# Patient Record
Sex: MALE | Race: WHITE | NOT HISPANIC OR LATINO | URBAN - METROPOLITAN AREA
[De-identification: names, ages, dates, MRNs, and addresses within clinical notes are randomized per-mention and may not be internally consistent; named-entity substitution may affect disease eponyms.]

---

## 2017-03-02 ENCOUNTER — OUTPATIENT (OUTPATIENT)
Dept: OUTPATIENT SERVICES | Facility: HOSPITAL | Age: 62
LOS: 1 days | Discharge: HOME | End: 2017-03-02

## 2017-06-27 DIAGNOSIS — E78.2 MIXED HYPERLIPIDEMIA: ICD-10-CM

## 2017-06-27 DIAGNOSIS — D47.2 MONOCLONAL GAMMOPATHY: ICD-10-CM

## 2017-06-27 DIAGNOSIS — I10 ESSENTIAL (PRIMARY) HYPERTENSION: ICD-10-CM

## 2017-07-29 ENCOUNTER — OUTPATIENT (OUTPATIENT)
Dept: OUTPATIENT SERVICES | Facility: HOSPITAL | Age: 62
LOS: 1 days | Discharge: HOME | End: 2017-07-29

## 2017-07-29 DIAGNOSIS — N39.0 URINARY TRACT INFECTION, SITE NOT SPECIFIED: ICD-10-CM

## 2017-07-29 DIAGNOSIS — Z79.899 OTHER LONG TERM (CURRENT) DRUG THERAPY: ICD-10-CM

## 2017-07-29 DIAGNOSIS — D47.2 MONOCLONAL GAMMOPATHY: ICD-10-CM

## 2017-07-29 DIAGNOSIS — R53.81 OTHER MALAISE: ICD-10-CM

## 2017-07-29 DIAGNOSIS — R53.83 OTHER FATIGUE: ICD-10-CM

## 2017-07-29 DIAGNOSIS — Z12.12 ENCOUNTER FOR SCREENING FOR MALIGNANT NEOPLASM OF RECTUM: ICD-10-CM

## 2017-08-04 ENCOUNTER — OUTPATIENT (OUTPATIENT)
Dept: OUTPATIENT SERVICES | Facility: HOSPITAL | Age: 62
LOS: 1 days | Discharge: HOME | End: 2017-08-04

## 2017-08-04 DIAGNOSIS — R82.90 UNSPECIFIED ABNORMAL FINDINGS IN URINE: ICD-10-CM

## 2017-08-05 ENCOUNTER — OUTPATIENT (OUTPATIENT)
Dept: OUTPATIENT SERVICES | Facility: HOSPITAL | Age: 62
LOS: 1 days | Discharge: HOME | End: 2017-08-05

## 2017-08-05 DIAGNOSIS — R82.90 UNSPECIFIED ABNORMAL FINDINGS IN URINE: ICD-10-CM

## 2017-08-07 ENCOUNTER — OUTPATIENT (OUTPATIENT)
Dept: OUTPATIENT SERVICES | Facility: HOSPITAL | Age: 62
LOS: 1 days | Discharge: HOME | End: 2017-08-07

## 2017-08-07 DIAGNOSIS — R82.90 UNSPECIFIED ABNORMAL FINDINGS IN URINE: ICD-10-CM

## 2018-01-18 ENCOUNTER — OUTPATIENT (OUTPATIENT)
Dept: OUTPATIENT SERVICES | Facility: HOSPITAL | Age: 63
LOS: 1 days | Discharge: HOME | End: 2018-01-18

## 2018-01-18 DIAGNOSIS — R70.0 ELEVATED ERYTHROCYTE SEDIMENTATION RATE: ICD-10-CM

## 2018-01-18 DIAGNOSIS — R68.89 OTHER GENERAL SYMPTOMS AND SIGNS: ICD-10-CM

## 2018-01-18 DIAGNOSIS — R74.0 NONSPECIFIC ELEVATION OF LEVELS OF TRANSAMINASE AND LACTIC ACID DEHYDROGENASE [LDH]: ICD-10-CM

## 2018-01-18 DIAGNOSIS — C34.90 MALIGNANT NEOPLASM OF UNSPECIFIED PART OF UNSPECIFIED BRONCHUS OR LUNG: ICD-10-CM

## 2018-01-18 DIAGNOSIS — N39.0 URINARY TRACT INFECTION, SITE NOT SPECIFIED: ICD-10-CM

## 2018-05-26 ENCOUNTER — OUTPATIENT (OUTPATIENT)
Dept: OUTPATIENT SERVICES | Facility: HOSPITAL | Age: 63
LOS: 1 days | Discharge: HOME | End: 2018-05-26

## 2018-05-26 DIAGNOSIS — C34.90 MALIGNANT NEOPLASM OF UNSPECIFIED PART OF UNSPECIFIED BRONCHUS OR LUNG: ICD-10-CM

## 2018-05-26 DIAGNOSIS — R68.89 OTHER GENERAL SYMPTOMS AND SIGNS: ICD-10-CM

## 2018-09-20 ENCOUNTER — OUTPATIENT (OUTPATIENT)
Dept: OUTPATIENT SERVICES | Facility: HOSPITAL | Age: 63
LOS: 1 days | Discharge: HOME | End: 2018-09-20

## 2018-09-20 DIAGNOSIS — E78.00 PURE HYPERCHOLESTEROLEMIA, UNSPECIFIED: ICD-10-CM

## 2018-09-20 DIAGNOSIS — I10 ESSENTIAL (PRIMARY) HYPERTENSION: ICD-10-CM

## 2018-09-20 DIAGNOSIS — E03.9 HYPOTHYROIDISM, UNSPECIFIED: ICD-10-CM

## 2018-09-20 DIAGNOSIS — D47.1 CHRONIC MYELOPROLIFERATIVE DISEASE: ICD-10-CM

## 2019-01-23 ENCOUNTER — OUTPATIENT (OUTPATIENT)
Dept: OUTPATIENT SERVICES | Facility: HOSPITAL | Age: 64
LOS: 1 days | Discharge: HOME | End: 2019-01-23

## 2019-01-23 DIAGNOSIS — D51.1 VITAMIN B12 DEFICIENCY ANEMIA DUE TO SELECTIVE VITAMIN B12 MALABSORPTION WITH PROTEINURIA: ICD-10-CM

## 2019-01-23 DIAGNOSIS — C34.90 MALIGNANT NEOPLASM OF UNSPECIFIED PART OF UNSPECIFIED BRONCHUS OR LUNG: ICD-10-CM

## 2019-01-23 DIAGNOSIS — D50.9 IRON DEFICIENCY ANEMIA, UNSPECIFIED: ICD-10-CM

## 2019-01-23 DIAGNOSIS — M06.9 RHEUMATOID ARTHRITIS, UNSPECIFIED: ICD-10-CM

## 2019-01-23 DIAGNOSIS — D59.9 ACQUIRED HEMOLYTIC ANEMIA, UNSPECIFIED: ICD-10-CM

## 2019-05-24 ENCOUNTER — OUTPATIENT (OUTPATIENT)
Dept: OUTPATIENT SERVICES | Facility: HOSPITAL | Age: 64
LOS: 1 days | Discharge: HOME | End: 2019-05-24

## 2019-05-24 DIAGNOSIS — D59.9 ACQUIRED HEMOLYTIC ANEMIA, UNSPECIFIED: ICD-10-CM

## 2019-05-24 DIAGNOSIS — D51.1 VITAMIN B12 DEFICIENCY ANEMIA DUE TO SELECTIVE VITAMIN B12 MALABSORPTION WITH PROTEINURIA: ICD-10-CM

## 2019-05-24 DIAGNOSIS — D47.2 MONOCLONAL GAMMOPATHY: ICD-10-CM

## 2019-05-24 DIAGNOSIS — C34.90 MALIGNANT NEOPLASM OF UNSPECIFIED PART OF UNSPECIFIED BRONCHUS OR LUNG: ICD-10-CM

## 2019-05-24 DIAGNOSIS — D50.9 IRON DEFICIENCY ANEMIA, UNSPECIFIED: ICD-10-CM

## 2019-05-24 DIAGNOSIS — M06.9 RHEUMATOID ARTHRITIS, UNSPECIFIED: ICD-10-CM

## 2019-09-10 ENCOUNTER — OUTPATIENT (OUTPATIENT)
Dept: OUTPATIENT SERVICES | Facility: HOSPITAL | Age: 64
LOS: 1 days | Discharge: HOME | End: 2019-09-10

## 2019-09-10 DIAGNOSIS — Z12.5 ENCOUNTER FOR SCREENING FOR MALIGNANT NEOPLASM OF PROSTATE: ICD-10-CM

## 2019-09-10 DIAGNOSIS — E78.00 PURE HYPERCHOLESTEROLEMIA, UNSPECIFIED: ICD-10-CM

## 2019-09-10 DIAGNOSIS — N39.0 URINARY TRACT INFECTION, SITE NOT SPECIFIED: ICD-10-CM

## 2019-09-10 DIAGNOSIS — N40.1 BENIGN PROSTATIC HYPERPLASIA WITH LOWER URINARY TRACT SYMPTOMS: ICD-10-CM

## 2019-09-10 DIAGNOSIS — D50.9 IRON DEFICIENCY ANEMIA, UNSPECIFIED: ICD-10-CM

## 2019-09-10 DIAGNOSIS — R97.20 ELEVATED PROSTATE SPECIFIC ANTIGEN [PSA]: ICD-10-CM

## 2020-03-20 ENCOUNTER — EMERGENCY (EMERGENCY)
Facility: HOSPITAL | Age: 65
LOS: 0 days | Discharge: HOME | End: 2020-03-20
Attending: EMERGENCY MEDICINE | Admitting: EMERGENCY MEDICINE
Payer: MEDICARE

## 2020-03-20 VITALS
SYSTOLIC BLOOD PRESSURE: 152 MMHG | HEIGHT: 67 IN | OXYGEN SATURATION: 95 % | TEMPERATURE: 98 F | DIASTOLIC BLOOD PRESSURE: 95 MMHG | HEART RATE: 87 BPM | WEIGHT: 190.04 LBS | RESPIRATION RATE: 18 BRPM

## 2020-03-20 DIAGNOSIS — S61.210A LACERATION WITHOUT FOREIGN BODY OF RIGHT INDEX FINGER WITHOUT DAMAGE TO NAIL, INITIAL ENCOUNTER: ICD-10-CM
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
## 2020-03-20 DIAGNOSIS — Z23 ENCOUNTER FOR IMMUNIZATION: ICD-10-CM

## 2020-03-20 DIAGNOSIS — E78.00 PURE HYPERCHOLESTEROLEMIA, UNSPECIFIED: ICD-10-CM
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
## 2020-03-20 DIAGNOSIS — E78.5 HYPERLIPIDEMIA, UNSPECIFIED: ICD-10-CM
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
## 2020-03-20 PROCEDURE — 99283 EMERGENCY DEPT VISIT LOW MDM: CPT | Mod: 25

## 2020-03-20 PROCEDURE — 12002 RPR S/N/AX/GEN/TRNK2.6-7.5CM: CPT

## 2020-03-20 RX ORDER — TETANUS TOXOID, REDUCED DIPHTHERIA TOXOID AND ACELLULAR PERTUSSIS VACCINE, ADSORBED 5; 2.5; 8; 8; 2.5 [IU]/.5ML; [IU]/.5ML; UG/.5ML; UG/.5ML; UG/.5ML
0.5 SUSPENSION INTRAMUSCULAR ONCE
Refills: 0 | Status: COMPLETED | OUTPATIENT
Start: 2020-03-20 | End: 2020-03-20

## 2020-03-20 RX ADMIN — TETANUS TOXOID, REDUCED DIPHTHERIA TOXOID AND ACELLULAR PERTUSSIS VACCINE, ADSORBED 0.5 MILLILITER(S): 5; 2.5; 8; 8; 2.5 SUSPENSION INTRAMUSCULAR at 17:24

## 2020-03-20 NOTE — ED PROVIDER NOTE - NS ED ROS FT
CONST: No fever, chills or body aches  MSk: + finger pain.   SKIN: + laceration.   NEURO: no paresthesias. No headache, dizziness,

## 2020-03-20 NOTE — ED PROVIDER NOTE - ATTENDING CONTRIBUTION TO CARE
Bedside and Verbal shift change report given to Johnnie Martin RN. 63yo M presenting with laceration to R finger sp accidental injury with while slicing. No numbness/focal weakness. No other injuries. NVI. no nailbed involvement. +venous bleeding.

## 2020-03-20 NOTE — ED PROCEDURE NOTE - CPROC ED POST PROC CARE GUIDE1
Instructed patient/caregiver to follow-up with primary care physician./Verbal/written post procedure instructions were given to patient/caregiver./Keep the cast/splint/dressing clean and dry./Instructed patient/caregiver regarding signs and symptoms of infection./Elevate the injured extremity as instructed.
Keep the cast/splint/dressing clean and dry./Instructed patient/caregiver to follow-up with primary care physician./Verbal/written post procedure instructions were given to patient/caregiver./Instructed patient/caregiver regarding signs and symptoms of infection.

## 2020-03-20 NOTE — ED PROVIDER NOTE - PHYSICAL EXAMINATION
CONST: Well appearing in NAD  MS: FROM against resistance R 2nd finger without difficulty, cap refill < 2 seconds, radial pulses 2+ bilaterally  SKIN: 3 cm laceration R distal 2nd fingertip palmar aspect, no involvement of nail bed, no tendon or bone involvement  NEURO: R hand NVI

## 2020-03-20 NOTE — ED PROVIDER NOTE - OBJECTIVE STATEMENT
64 y.o male w/ hx of HLD, anxiety presents to the ED for evaluation of lac R 2nd finger.  On slicer cut finger.  Acute pain, throbbing, worse w/ palpation, mild severity, no radiation of pain.  Denies paresthesias, difficulty with ROM. not utd on tdap.

## 2020-03-20 NOTE — ED PROVIDER NOTE - NSFOLLOWUPINSTRUCTIONS_ED_ALL_ED_FT
Laceration    A laceration is a cut that goes through all of the layers of the skin and into the tissue that is right under the skin. Some lacerations heal on their own. Others need to be closed with stitches (sutures), staples, skin adhesive strips, or skin glue. Proper laceration care minimizes the risk of infection and helps the laceration to heal better.     SEEK IMMEDIATE MEDICAL CARE IF YOU HAVE THE FOLLOWING SYMPTOMS: swelling around the wound, worsening pain, drainage from the wound, red streaking going away from your wound, inability to move finger or toe near the laceration, or discoloration of skin near the laceration. Laceration    A laceration is a cut that goes through all of the layers of the skin and into the tissue that is right under the skin. Some lacerations heal on their own. Others need to be closed with stitches (sutures), staples, skin adhesive strips, or skin glue. Proper laceration care minimizes the risk of infection and helps the laceration to heal better.     SEEK IMMEDIATE MEDICAL CARE IF YOU HAVE THE FOLLOWING SYMPTOMS: swelling around the wound, worsening pain, drainage from the wound, red streaking going away from your wound, inability to move finger or toe near the laceration, or discoloration of skin near the laceration.    suture removal in 10-14 days    Follow up with your primary medical doctor in 1-2 days

## 2020-03-20 NOTE — ED PROVIDER NOTE - CLINICAL SUMMARY MEDICAL DECISION MAKING FREE TEXT BOX
Laceration to R finger sp accidental injury with while slicing. No numbness/focal weakness. No other injuries. NVI. sp repair. Comfortable with discharge and follow-up outpatient, strict return precautions given. Endorses understanding of all of this and aware that they can return at any time for new or concerning symptoms. No further questions or concerns at this time

## 2020-03-20 NOTE — ED PROVIDER NOTE - PATIENT PORTAL LINK FT
You can access the FollowMyHealth Patient Portal offered by [facility] by registering at the following website: [email or web address]. By joining [facility] FollowMyHealth portal, you will also be able to view your health information using other applications (apps) compatible with our system.

## 2020-12-07 PROBLEM — F41.9 ANXIETY DISORDER, UNSPECIFIED: Chronic | Status: ACTIVE | Noted: 2020-03-20

## 2020-12-07 PROBLEM — E78.00 PURE HYPERCHOLESTEROLEMIA, UNSPECIFIED: Chronic | Status: ACTIVE | Noted: 2020-03-20

## 2021-02-12 ENCOUNTER — APPOINTMENT (OUTPATIENT)
Dept: UROLOGY | Facility: CLINIC | Age: 66
End: 2021-02-12
Payer: MEDICARE

## 2021-02-12 VITALS — HEIGHT: 67 IN | BODY MASS INDEX: 29.03 KG/M2 | WEIGHT: 185 LBS | TEMPERATURE: 97.3 F

## 2021-02-12 DIAGNOSIS — Z80.9 FAMILY HISTORY OF MALIGNANT NEOPLASM, UNSPECIFIED: ICD-10-CM

## 2021-02-12 DIAGNOSIS — F17.290 NICOTINE DEPENDENCE, OTHER TOBACCO PRODUCT, UNCOMPLICATED: ICD-10-CM

## 2021-02-12 DIAGNOSIS — N20.0 CALCULUS OF KIDNEY: ICD-10-CM

## 2021-02-12 DIAGNOSIS — Z78.9 OTHER SPECIFIED HEALTH STATUS: ICD-10-CM

## 2021-02-12 PROBLEM — Z00.00 ENCOUNTER FOR PREVENTIVE HEALTH EXAMINATION: Status: ACTIVE | Noted: 2021-02-12

## 2021-02-12 PROCEDURE — 99203 OFFICE O/P NEW LOW 30 MIN: CPT

## 2021-02-12 PROCEDURE — 99072 ADDL SUPL MATRL&STAF TM PHE: CPT

## 2021-02-12 NOTE — HISTORY OF PRESENT ILLNESS
[FreeTextEntry1] : 65-year-old with recent sonogram and liver MRI ordered by GI found hyperdense left renal cysts. He has no flank pain, no hematuria.\par \par His family history of prostate cancer and his PSA is 0.27. His father and uncle had prostate cancer. Patient has symptom score of 2/35 with rare urgency and frequency but good urinary stream, no nocturia, no sensation to void.

## 2021-02-12 NOTE — REVIEW OF SYSTEMS
[Fever] : fever [Feeling Poorly] : not feeling poorly [Sore Throat] : no sore throat [Chest Pain] : no chest pain [Shortness Of Breath] : no shortness of breath [Cough] : no cough [Constipation] : no constipation [Diarrhea] : no diarrhea [Dysuria] : no dysuria

## 2021-02-12 NOTE — PHYSICAL EXAM
[General Appearance - In No Acute Distress] : no acute distress [] : no respiratory distress [Abdomen Soft] : soft [Abdomen Tenderness] : non-tender [Costovertebral Angle Tenderness] : no ~M costovertebral angle tenderness [Normal Station and Gait] : the gait and station were normal for the patient's age [Oriented To Time, Place, And Person] : oriented to person, place, and time [Not Anxious] : not anxious

## 2021-02-12 NOTE — ASSESSMENT
[FreeTextEntry1] : Hyperdense left renal cysts on MRI 3 months ago. Recommend new MRI kidneys pre-and post gadolinium to look for interval change

## 2021-02-16 LAB — CREAT SERPL-MCNC: 3 MG/DL

## 2021-02-19 ENCOUNTER — NON-APPOINTMENT (OUTPATIENT)
Age: 66
End: 2021-02-19

## 2021-02-20 ENCOUNTER — OUTPATIENT (OUTPATIENT)
Dept: OUTPATIENT SERVICES | Facility: HOSPITAL | Age: 66
LOS: 1 days | Discharge: HOME | End: 2021-02-20
Payer: MEDICARE

## 2021-02-20 DIAGNOSIS — R93.49 ABNORMAL RADIOLOGIC FINDINGS ON DIAGNOSTIC IMAGING OF OTHER URINARY ORGANS: ICD-10-CM

## 2021-02-20 PROCEDURE — 74183 MRI ABD W/O CNTR FLWD CNTR: CPT | Mod: 26

## 2021-02-22 ENCOUNTER — NON-APPOINTMENT (OUTPATIENT)
Age: 66
End: 2021-02-22

## 2021-05-25 NOTE — ED ADULT NURSE NOTE - NS ED NURSE LEVEL OF CONSCIOUSNESS ORIENTATION
Writer spoke with patient for the [facility], Dr. Nunes,regarding your upcoming procedure on 06/01/2021.  We do require a COVID test 48-72 hours prior to that procedure. I am calling to make that appointment for you.   Covid screening appointment date of 05/30/2021.  You are to go to the [place] laboratory for you screening. You check in to a lab .  Once this nasal swab is completed we do ask that you self-quarantine to your home until the date and time of your procedure.   This means no working, no stores, no social gatherings, etc.   Patient understood and had no questions.    
Oriented - self; Oriented - place; Oriented - time

## 2021-08-31 ENCOUNTER — APPOINTMENT (OUTPATIENT)
Dept: UROLOGY | Facility: CLINIC | Age: 66
End: 2021-08-31
Payer: MEDICARE

## 2021-08-31 PROCEDURE — 99213 OFFICE O/P EST LOW 20 MIN: CPT

## 2021-08-31 NOTE — HISTORY OF PRESENT ILLNESS
[FreeTextEntry1] : Vaughn Lowe is a 66 year old male with MRI which showed left renal lesion to be nonenhancing hyperdense cysts measuring 1.2 cm in 02/22/2021. Patient presents to office today to review 6 month follow up Renal US. Renal US 08/19/2021 reveals mild echogenic kidneys, and renal cysts are not visualized on the study. No hydronephrosis or shadowing renal stones. \par \par Patient states that he feels well. Denies urinary complaints. PSA and blood work will be checked by Primary Care Doctor next week. Patient states he will send blood work PSA to the office for review.

## 2021-08-31 NOTE — ASSESSMENT
[FreeTextEntry1] : Renal Cysts are not visualized on repeat 6 month Sonogram.  Liver they have not enlarged and possibly have gotten smaller.\par Patient to get blood work and PSA drawn by PCP next week. Patient states he will send reports. \par \par Patient to follow up 1 year with repeat renal sonogram. \par

## 2021-08-31 NOTE — END OF VISIT
[FreeTextEntry3] : I participated in the obtaining of history, physical exam and formulating the treatment plan and agree with the above transcription by the physicians assistant

## 2021-12-11 PROBLEM — K21.9 GERD (GASTROESOPHAGEAL REFLUX DISEASE): Status: ACTIVE | Noted: 2021-12-11

## 2021-12-13 ENCOUNTER — NON-APPOINTMENT (OUTPATIENT)
Age: 66
End: 2021-12-13

## 2021-12-13 ENCOUNTER — APPOINTMENT (OUTPATIENT)
Dept: CARDIOLOGY | Facility: CLINIC | Age: 66
End: 2021-12-13
Payer: MEDICARE

## 2021-12-13 VITALS
HEIGHT: 67 IN | DIASTOLIC BLOOD PRESSURE: 70 MMHG | SYSTOLIC BLOOD PRESSURE: 122 MMHG | BODY MASS INDEX: 29.03 KG/M2 | WEIGHT: 185 LBS

## 2021-12-13 DIAGNOSIS — K21.9 GASTRO-ESOPHAGEAL REFLUX DISEASE W/OUT ESOPHAGITIS: ICD-10-CM

## 2021-12-13 PROCEDURE — 93000 ELECTROCARDIOGRAM COMPLETE: CPT

## 2021-12-13 PROCEDURE — 99213 OFFICE O/P EST LOW 20 MIN: CPT

## 2021-12-13 RX ORDER — ESCITALOPRAM OXALATE 10 MG/1
10 TABLET, FILM COATED ORAL
Refills: 0 | Status: ACTIVE | COMMUNITY

## 2021-12-13 NOTE — DISCUSSION/SUMMARY
[FreeTextEntry1] : Pt snores not dream .He had pain under left breast. His EKG is abnormal. Neg SE 11/20 positive EKG  He needs a sleep study.Blood reviewed glucose borderline . Increased trig. Iron low. Neg GI grace. May get camera.. Told continue exercise.. No change weight. He got 2 covid vaccines . Told get booster\par \par * SE done 11/12/20 Positive EKG neg Echo  exercised 9 min, Normal echo.

## 2021-12-13 NOTE — REVIEW OF SYSTEMS
[Hearing Loss] : hearing loss [Finger Pain] : pain in the finger [Knee Pain] : knee pain [Fever] : no fever [Chills] : no chills [Blurry Vision] : no blurred vision [SOB] : no shortness of breath [Chest Discomfort] : no chest discomfort [Syncope] : no syncope [Cough] : no cough [Wheezing] : no wheezing [Abdominal Pain] : no abdominal pain [Diarrhea] : diarrhea [Constipation] : no constipation [Urinary Frequency] : no change in urinary frequency [Rash] : no rash [Skin Lesions] : no skin lesions [Dizziness] : no dizziness [Weakness] : no weakness [Confusion] : no confusion was observed [Swollen Glands] : no swollen glands

## 2021-12-13 NOTE — HISTORY OF PRESENT ILLNESS
[FreeTextEntry1] : Patient with no chest pain. No sob. No Carranza. Weight no change. Goes gym 3 times a weight. He lifts weight . Treadmill 30 minutes

## 2022-03-02 ENCOUNTER — OUTPATIENT (OUTPATIENT)
Dept: OUTPATIENT SERVICES | Facility: HOSPITAL | Age: 67
LOS: 1 days | Discharge: HOME | End: 2022-03-02

## 2022-03-02 VITALS
RESPIRATION RATE: 18 BRPM | HEIGHT: 67 IN | WEIGHT: 182.1 LBS | TEMPERATURE: 96 F | SYSTOLIC BLOOD PRESSURE: 123 MMHG | DIASTOLIC BLOOD PRESSURE: 78 MMHG | HEART RATE: 80 BPM

## 2022-03-02 DIAGNOSIS — D64.9 ANEMIA, UNSPECIFIED: ICD-10-CM

## 2022-03-02 DIAGNOSIS — Z98.890 OTHER SPECIFIED POSTPROCEDURAL STATES: Chronic | ICD-10-CM

## 2022-03-02 DIAGNOSIS — K29.70 GASTRITIS, UNSPECIFIED, WITHOUT BLEEDING: ICD-10-CM

## 2022-03-02 RX ORDER — ESCITALOPRAM OXALATE 10 MG/1
0 TABLET, FILM COATED ORAL
Qty: 0 | Refills: 0 | DISCHARGE

## 2022-03-02 RX ORDER — ATORVASTATIN CALCIUM 80 MG/1
1 TABLET, FILM COATED ORAL
Qty: 0 | Refills: 0 | DISCHARGE

## 2022-03-02 NOTE — ASU PATIENT PROFILE, ADULT - FALL HARM RISK - UNIVERSAL INTERVENTIONS
Bed in lowest position, wheels locked, appropriate side rails in place/Call bell, personal items and telephone in reach/Instruct patient to call for assistance before getting out of bed or chair/Non-slip footwear when patient is out of bed/[place] to call system/Physically safe environment - no spills, clutter or unnecessary equipment/Purposeful Proactive Rounding/Room/bathroom lighting operational, light cord in reach

## 2022-03-02 NOTE — H&P PST ADULT - ASSESSMENT
Risk benefit and consent obtained regarding capsule study for Fe diff anemia  pt to return monitor as per nursing instructions.

## 2022-03-02 NOTE — ASU DISCHARGE PLAN (ADULT/PEDIATRIC) - NS MD DC FALL RISK RISK
For information on Fall & Injury Prevention, visit: [email or web address] OR  [email or web address] OR  https://www.cdc.gov/steadi/patient.html

## 2022-09-02 ENCOUNTER — APPOINTMENT (OUTPATIENT)
Dept: UROLOGY | Facility: CLINIC | Age: 67
End: 2022-09-02

## 2022-09-02 VITALS
TEMPERATURE: 98 F | WEIGHT: 185 LBS | HEART RATE: 90 BPM | HEIGHT: 67 IN | BODY MASS INDEX: 29.03 KG/M2 | DIASTOLIC BLOOD PRESSURE: 85 MMHG | SYSTOLIC BLOOD PRESSURE: 132 MMHG

## 2022-09-02 PROCEDURE — 99214 OFFICE O/P EST MOD 30 MIN: CPT

## 2022-09-02 RX ORDER — NYSTATIN AND TRIAMCINOLONE ACETONIDE 100000; 1 MG/G; MG/G
100000-0.1 CREAM TOPICAL
Qty: 60 | Refills: 2 | Status: ACTIVE | COMMUNITY
Start: 2022-09-02 | End: 1900-01-01

## 2022-09-02 NOTE — HISTORY OF PRESENT ILLNESS
[FreeTextEntry1] : 67-year-old with history of BPH.  He has nocturia x0.  Good urinary flow.  Daytime frequency but not bothersome to patient.  PSA 10/13/21 0.37. \par \par History of a 1.2 cm left renal hyperdense cyst stable on previous MRIs.  He has a history of right renal cyst also.  Sonogram 1 year ago showed 2 tiny left renal hemorrhagic cysts on follow-up.  Recently he underwent a MRI I of the abdomen pre and post IV contrast showing a 1.1 cm right upper pole renal lesion likely a hemorrhagic cyst.  On sonogram there is no lesion seen on the right and a left mid to lower pole parapelvic cyst was seen.\par \par Complaining of jock itch

## 2022-09-02 NOTE — REVIEW OF SYSTEMS
[Feeling Poorly] : not feeling poorly [Sore Throat] : no sore throat [Chest Pain] : no chest pain [Shortness Of Breath] : no shortness of breath [Cough] : no cough [Constipation] : no constipation [Confused] : no confusion

## 2022-12-12 ENCOUNTER — APPOINTMENT (OUTPATIENT)
Dept: CARDIOLOGY | Facility: CLINIC | Age: 67
End: 2022-12-12

## 2023-01-19 ENCOUNTER — TRANSCRIPTION ENCOUNTER (OUTPATIENT)
Age: 68
End: 2023-01-19

## 2023-01-19 ENCOUNTER — APPOINTMENT (OUTPATIENT)
Dept: CARDIOLOGY | Facility: CLINIC | Age: 68
End: 2023-01-19
Payer: MEDICARE

## 2023-01-19 VITALS
HEIGHT: 67 IN | WEIGHT: 175 LBS | DIASTOLIC BLOOD PRESSURE: 80 MMHG | BODY MASS INDEX: 27.47 KG/M2 | SYSTOLIC BLOOD PRESSURE: 120 MMHG

## 2023-01-19 PROCEDURE — 99213 OFFICE O/P EST LOW 20 MIN: CPT | Mod: 25

## 2023-01-19 PROCEDURE — 93000 ELECTROCARDIOGRAM COMPLETE: CPT

## 2023-01-19 NOTE — PHYSICAL EXAM
[Well Developed] : well developed [Well Nourished] : well nourished [No Acute Distress] : no acute distress [Normal Conjunctiva] : normal conjunctiva [Normal Venous Pressure] : normal venous pressure [No Carotid Bruit] : no carotid bruit [5th Left ICS - MCL] : palpated at the 5th LICS in the midclavicular line [Normal] : normal [No Precordial Heave] : no precordial heave was noted [Normal Rate] : normal [Rhythm Regular] : regular [Normal S1] : normal S1 [Normal S2] : normal S2 [S3] : no S3 [S4] : no S4 [I] : a grade 1 [No Pitting Edema] : no pitting edema present [Right Carotid Bruit] : no bruit heard over the right carotid [Left Carotid Bruit] : no bruit heard over the left carotid [Right Femoral Bruit] : no bruit heard over the right femoral artery [Left Femoral Bruit] : no bruit heard over the left femoral artery [2+] : left 2+ [No Abnormalities] : the abdominal aorta was not enlarged and no bruit was heard [Clear Lung Fields] : clear lung fields [Good Air Entry] : good air entry [No Respiratory Distress] : no respiratory distress  [Soft] : abdomen soft [Non Tender] : non-tender [No Masses/organomegaly] : no masses/organomegaly [Normal Bowel Sounds] : normal bowel sounds [Normal Gait] : normal gait [No Edema] : no edema [No Cyanosis] : no cyanosis [No Clubbing] : no clubbing [No Varicosities] : no varicosities [No Rash] : no rash [No Skin Lesions] : no skin lesions [Moves all extremities] : moves all extremities [No Focal Deficits] : no focal deficits [Normal Speech] : normal speech [Alert and Oriented] : alert and oriented [Normal memory] : normal memory

## 2023-01-19 NOTE — DISCUSSION/SUMMARY
[FreeTextEntry1] : Pt snores not dream .He had pain under left breast. His EKG is abnormal. Neg SE 11/20 positive EKG  He needs a sleep study .Blood reviewed glucose borderline . Increased trig. Iron low past . Neg GI grace. Neg camera. Told continue exercise.. . \par Patient with no chest pain. No sob. No Carranza. He lost 10 llbs . Goes gym 3 times a weight. He lifts weight . Treadmill 30 minutes. Bloods reviewed.. Hemoglobin A1c 6.2.. EKG reviewed. Continue diet\par \par * SE done 11/12/20 Positive EKG neg Echo  exercised 9 min, Normal echo.

## 2023-01-19 NOTE — REVIEW OF SYSTEMS
[Fever] : no fever [Chills] : no chills [Blurry Vision] : no blurred vision [Hearing Loss] : hearing loss [SOB] : no shortness of breath [Chest Discomfort] : no chest discomfort [Syncope] : no syncope [Cough] : no cough [Wheezing] : no wheezing [Abdominal Pain] : no abdominal pain [Diarrhea] : diarrhea [Constipation] : no constipation [Urinary Frequency] : no change in urinary frequency [Finger Pain] : pain in the finger [Knee Pain] : knee pain [Rash] : no rash [Skin Lesions] : no skin lesions [Dizziness] : no dizziness [Weakness] : no weakness [Confusion] : no confusion was observed [Swollen Glands] : no swollen glands

## 2023-03-23 NOTE — REASON FOR VISIT
Phone call placed to patient- Patient needs a 0 5 mg  [Follow-up Visit ___] : a follow-up visit  for [unfilled] Need for prophylactic measure HTN (hypertension)

## 2023-09-08 ENCOUNTER — APPOINTMENT (OUTPATIENT)
Dept: UROLOGY | Facility: CLINIC | Age: 68
End: 2023-09-08
Payer: MEDICARE

## 2023-09-08 VITALS
SYSTOLIC BLOOD PRESSURE: 135 MMHG | DIASTOLIC BLOOD PRESSURE: 90 MMHG | WEIGHT: 180 LBS | HEIGHT: 67 IN | HEART RATE: 79 BPM | BODY MASS INDEX: 28.25 KG/M2 | TEMPERATURE: 97.2 F

## 2023-09-08 DIAGNOSIS — R39.9 UNSPECIFIED SYMPTOMS AND SIGNS INVOLVING THE GENITOURINARY SYSTEM: ICD-10-CM

## 2023-09-08 DIAGNOSIS — N28.1 CYST OF KIDNEY, ACQUIRED: ICD-10-CM

## 2023-09-08 PROCEDURE — 99214 OFFICE O/P EST MOD 30 MIN: CPT

## 2023-09-08 NOTE — ASSESSMENT
[FreeTextEntry1] : Hypodense kidney lesions previously were too small to characterize fully and now recommend repeat MRI of the kidney to look for interval change.  We will need serum creatinine prior.  BPH.  No significant symptoms requiring treatment but needs new PSA. [Urinary Symptom or Sign (788.99\R39.89)] : implantation

## 2023-09-08 NOTE — HISTORY OF PRESENT ILLNESS
[FreeTextEntry1] : 68-year-old with history of a 1.2 cm left hyperdense lesion and also a right upper pole lesion on MRI approximately 1 year ago.  He also has simple kidney cysts.  He has no gross hematuria.  He has no flank pain.  UA 1/3/2023 negative.  History of BPH.  No urinary complaint.  No recent PSA.

## 2023-09-08 NOTE — REVIEW OF SYSTEMS
[Feeling Poorly] : not feeling poorly [Confused] : no confusion Patient found to have UTI with UA positive for leuk esterase, WBCs. Patient with 4/4 SIRS and lactate of 3.6. Patient has history of bilateral hydronephrosis colonized with pan sensitive klebsiella 2/2 outflow obstruction. Likely severe sepsis 2/2 to UTI.  - Patient given 1 dose ceftriaxone in ED  - Now on Zosyn 4.5gm q6h as has been treated with multiple UTIs in past so covering for pseudomonas  - Lactate cleared s/p 3L NS  - Tylenol PRN for fever greater than 100.4  - F/u urine cultures - now with gram negative rods  - F/u blood cultures - NG x1 day  - F/u retroperitoneal U/S, pending read  - F/u abdominal CT, pending read  - Monitor I/Os  - Urology consulted, recs appreciated  - c/w 80cc/hr NS

## 2023-09-25 ENCOUNTER — NON-APPOINTMENT (OUTPATIENT)
Age: 68
End: 2023-09-25

## 2023-09-25 DIAGNOSIS — G54.2 CERVICAL ROOT DISORDERS, NOT ELSEWHERE CLASSIFIED: ICD-10-CM

## 2023-09-25 DIAGNOSIS — Q10.0 CONGENITAL PTOSIS: ICD-10-CM

## 2023-09-25 DIAGNOSIS — G54.4 LUMBOSACRAL ROOT DISORDERS, NOT ELSEWHERE CLASSIFIED: ICD-10-CM

## 2023-09-25 DIAGNOSIS — G95.0 SYRINGOMYELIA AND SYRINGOBULBIA: ICD-10-CM

## 2023-09-25 DIAGNOSIS — Z87.828 PERSONAL HISTORY OF OTHER (HEALED) PHYSICAL INJURY AND TRAUMA: ICD-10-CM

## 2023-09-25 DIAGNOSIS — G62.89 OTHER SPECIFIED POLYNEUROPATHIES: ICD-10-CM

## 2023-10-12 NOTE — ED ADULT TRIAGE NOTE - ESI TRIAGE ACUITY LEVEL, MLM
3 Ivermectin Counseling:  Patient instructed to take medication on an empty stomach with a full glass of water.  Patient informed of potential adverse effects including but not limited to nausea, diarrhea, dizziness, itching, and swelling of the extremities or lymph nodes.  The patient verbalized understanding of the proper use and possible adverse effects of ivermectin.  All of the patient's questions and concerns were addressed.

## 2023-10-25 ENCOUNTER — NON-APPOINTMENT (OUTPATIENT)
Age: 68
End: 2023-10-25

## 2023-10-31 ENCOUNTER — APPOINTMENT (OUTPATIENT)
Dept: UROLOGY | Facility: CLINIC | Age: 68
End: 2023-10-31
Payer: MEDICARE

## 2023-10-31 VITALS
BODY MASS INDEX: 28.72 KG/M2 | HEIGHT: 67 IN | SYSTOLIC BLOOD PRESSURE: 137 MMHG | HEART RATE: 79 BPM | WEIGHT: 183 LBS | DIASTOLIC BLOOD PRESSURE: 87 MMHG

## 2023-10-31 DIAGNOSIS — R93.49 ABNORMAL RADIOLOGIC FINDINGS ON DIAGNOSITIC IMAGING OF OTHER URINARY ORGANS: ICD-10-CM

## 2023-10-31 DIAGNOSIS — N40.1 BENIGN PROSTATIC HYPERPLASIA WITH LOWER URINARY TRACT SYMPMS: ICD-10-CM

## 2023-10-31 DIAGNOSIS — N13.8 BENIGN PROSTATIC HYPERPLASIA WITH LOWER URINARY TRACT SYMPMS: ICD-10-CM

## 2023-10-31 PROCEDURE — 99214 OFFICE O/P EST MOD 30 MIN: CPT

## 2023-12-12 ENCOUNTER — APPOINTMENT (OUTPATIENT)
Dept: ORTHOPEDIC SURGERY | Facility: CLINIC | Age: 68
End: 2023-12-12
Payer: MEDICARE

## 2023-12-12 VITALS — BODY MASS INDEX: 28.56 KG/M2 | HEIGHT: 67 IN | WEIGHT: 182 LBS

## 2023-12-12 PROCEDURE — 20610 DRAIN/INJ JOINT/BURSA W/O US: CPT | Mod: RT

## 2023-12-12 PROCEDURE — 73030 X-RAY EXAM OF SHOULDER: CPT | Mod: RT

## 2023-12-12 PROCEDURE — 99203 OFFICE O/P NEW LOW 30 MIN: CPT | Mod: 25

## 2023-12-12 RX ORDER — TIZANIDINE 4 MG/1
4 TABLET ORAL
Qty: 30 | Refills: 0 | Status: ACTIVE | COMMUNITY
Start: 2023-12-12 | End: 1900-01-01

## 2023-12-12 RX ORDER — MELOXICAM 15 MG/1
15 TABLET ORAL
Qty: 30 | Refills: 0 | Status: ACTIVE | COMMUNITY
Start: 2023-12-12 | End: 1900-01-01

## 2024-01-04 ENCOUNTER — APPOINTMENT (OUTPATIENT)
Dept: ORTHOPEDIC SURGERY | Facility: CLINIC | Age: 69
End: 2024-01-04
Payer: MEDICARE

## 2024-01-04 DIAGNOSIS — M75.111 INCOMPLETE ROTATOR CUFF TEAR OR RUPTURE OF RIGHT SHOULDER, NOT SPECIFIED AS TRAUMATIC: ICD-10-CM

## 2024-01-04 DIAGNOSIS — S46.211A STRAIN OF MUSCLE, FASCIA AND TENDON OF OTHER PARTS OF BICEPS, RIGHT ARM, INITIAL ENCOUNTER: ICD-10-CM

## 2024-01-04 PROCEDURE — 99213 OFFICE O/P EST LOW 20 MIN: CPT

## 2024-01-04 NOTE — DISCUSSION/SUMMARY
[de-identified] : Explained to the patient that he clinically may have ruptured his proximal biceps tendon.  Right shoulder MRI ordered for further evaluation.  Patient was advised to call the office a few days after getting the MRI done to discuss results over the phone.  He will continue the shoulder conditioning exercises at home.  He will take OTC NSAIDs as needed for pain.  Follow-up in 4 to 6 weeks.  All the patient's questions/concerns were answered in detail.

## 2024-01-04 NOTE — HISTORY OF PRESENT ILLNESS
[de-identified] : Patient is a 68-year-old male who reports to the office for subsequent reevaluation of his right shoulder pain.  Mobic and PT have given him no relief.  He states that he felt and heard a popping sensation in his right shoulder last week.  This caused his upper arm/bicep region to bruise.  Lifting, range of motion, and palpating certain areas aggravate the patient's pain.  Denies any numbness or tingling.

## 2024-01-04 NOTE — PHYSICAL EXAM
[Right] : right shoulder [NL (0-90)] : full external rotation 0-90 degrees [Sitting] : sitting [4 ___] : forward flexion 4[unfilled]/5 [4___] : internal rotation 4[unfilled]/5 [] : motor and sensory intact distally [FreeTextEntry3] : Ecchymosis and mild swelling noted of right upper arm/biceps region [TWNoteComboBox7] : active forward flexion 165 degrees [TWNoteComboBox4] : passive forward flexion 180 degrees [de-identified] : active abduction 165 degrees [TWNoteComboBox9] : passive abduction 180 degrees [TWNoteComboBox6] : internal rotation L3

## 2024-01-16 ENCOUNTER — APPOINTMENT (OUTPATIENT)
Dept: CARDIOLOGY | Facility: CLINIC | Age: 69
End: 2024-01-16
Payer: MEDICARE

## 2024-01-16 VITALS
HEART RATE: 97 BPM | WEIGHT: 182 LBS | DIASTOLIC BLOOD PRESSURE: 80 MMHG | HEIGHT: 67 IN | BODY MASS INDEX: 28.56 KG/M2 | SYSTOLIC BLOOD PRESSURE: 130 MMHG | OXYGEN SATURATION: 98 %

## 2024-01-16 DIAGNOSIS — E78.5 HYPERLIPIDEMIA, UNSPECIFIED: ICD-10-CM

## 2024-01-16 DIAGNOSIS — R94.31 ABNORMAL ELECTROCARDIOGRAM [ECG] [EKG]: ICD-10-CM

## 2024-01-16 DIAGNOSIS — R07.9 CHEST PAIN, UNSPECIFIED: ICD-10-CM

## 2024-01-16 PROCEDURE — 93000 ELECTROCARDIOGRAM COMPLETE: CPT

## 2024-01-16 PROCEDURE — 99214 OFFICE O/P EST MOD 30 MIN: CPT | Mod: 25

## 2024-01-16 RX ORDER — ATORVASTATIN CALCIUM 10 MG/1
10 TABLET, FILM COATED ORAL
Qty: 90 | Refills: 3 | Status: ACTIVE | COMMUNITY

## 2024-01-16 RX ORDER — PANTOPRAZOLE 40 MG/1
40 TABLET, DELAYED RELEASE ORAL
Refills: 0 | Status: ACTIVE | COMMUNITY

## 2024-01-16 RX ORDER — METFORMIN HYDROCHLORIDE 500 MG/1
500 TABLET, COATED ORAL DAILY
Refills: 0 | Status: ACTIVE | COMMUNITY

## 2024-01-16 NOTE — HISTORY OF PRESENT ILLNESS
[FreeTextEntry1] : Patient with no chest pain. No sob. No Carranza. No change weight. Not exercise. Injured shoulder. To get MRI. Hemoglobin A1c 6..5 . Now on metformin. Got from [facility] .

## 2024-01-16 NOTE — DISCUSSION/SUMMARY
[FreeTextEntry1] : Pt snores not dream .He had pain under left breast. His EKG is abnormal. Neg SE 11/20 positive EKG  He needs a sleep study . Iron low past . Neg GI grace. Neg camera. Told resume  exercise.. .  Patient with no chest pain. No sob. No Carranza.   EKG reviewed. Continue diet  Hemoglobin A1c 6..5 . Now on metformin. Got from [facility] .  * SE done 11/12/20 Positive EKG neg Echo  exercised 9 min, Normal echo. Now diabetic on metformin. Reviewed diet and exercise.

## 2024-02-01 ENCOUNTER — APPOINTMENT (OUTPATIENT)
Dept: ORTHOPEDIC SURGERY | Facility: CLINIC | Age: 69
End: 2024-02-01

## 2024-02-29 ENCOUNTER — OUTPATIENT (OUTPATIENT)
Dept: OUTPATIENT SERVICES | Facility: HOSPITAL | Age: 69
LOS: 1 days | End: 2024-02-29
Payer: MEDICARE

## 2024-02-29 ENCOUNTER — RESULT REVIEW (OUTPATIENT)
Age: 69
End: 2024-02-29

## 2024-02-29 ENCOUNTER — APPOINTMENT (OUTPATIENT)
Dept: CV DIAGNOSTICS | Facility: HOSPITAL | Age: 69
End: 2024-02-29
Payer: MEDICARE

## 2024-02-29 DIAGNOSIS — I10 ESSENTIAL (PRIMARY) HYPERTENSION: ICD-10-CM

## 2024-02-29 DIAGNOSIS — R07.9 CHEST PAIN, UNSPECIFIED: ICD-10-CM

## 2024-02-29 DIAGNOSIS — R94.31 ABNORMAL ELECTROCARDIOGRAM [ECG] [EKG]: ICD-10-CM

## 2024-02-29 DIAGNOSIS — Z98.890 OTHER SPECIFIED POSTPROCEDURAL STATES: Chronic | ICD-10-CM

## 2024-02-29 PROCEDURE — 93320 DOPPLER ECHO COMPLETE: CPT

## 2024-02-29 PROCEDURE — 93351 STRESS TTE COMPLETE: CPT

## 2024-02-29 PROCEDURE — 93325 DOPPLER ECHO COLOR FLOW MAPG: CPT

## 2024-02-29 PROCEDURE — 93351 STRESS TTE COMPLETE: CPT | Mod: 26

## 2024-03-01 DIAGNOSIS — R07.9 CHEST PAIN, UNSPECIFIED: ICD-10-CM

## 2024-03-01 DIAGNOSIS — R94.31 ABNORMAL ELECTROCARDIOGRAM [ECG] [EKG]: ICD-10-CM

## 2024-03-14 ENCOUNTER — APPOINTMENT (OUTPATIENT)
Dept: ORTHOPEDIC SURGERY | Facility: CLINIC | Age: 69
End: 2024-03-14

## 2024-11-01 ENCOUNTER — NON-APPOINTMENT (OUTPATIENT)
Age: 69
End: 2024-11-01

## 2024-11-01 ENCOUNTER — APPOINTMENT (OUTPATIENT)
Dept: UROLOGY | Facility: CLINIC | Age: 69
End: 2024-11-01
Payer: MEDICARE

## 2024-11-01 VITALS
OXYGEN SATURATION: 95 % | WEIGHT: 180 LBS | HEART RATE: 85 BPM | BODY MASS INDEX: 28.25 KG/M2 | HEIGHT: 67 IN | SYSTOLIC BLOOD PRESSURE: 143 MMHG | RESPIRATION RATE: 18 BRPM | DIASTOLIC BLOOD PRESSURE: 88 MMHG

## 2024-11-01 DIAGNOSIS — R93.49 ABNORMAL RADIOLOGIC FINDINGS ON DIAGNOSITIC IMAGING OF OTHER URINARY ORGANS: ICD-10-CM

## 2024-11-01 DIAGNOSIS — N13.8 BENIGN PROSTATIC HYPERPLASIA WITH LOWER URINARY TRACT SYMPMS: ICD-10-CM

## 2024-11-01 DIAGNOSIS — N40.1 BENIGN PROSTATIC HYPERPLASIA WITH LOWER URINARY TRACT SYMPMS: ICD-10-CM

## 2024-11-01 LAB
BILIRUB UR QL STRIP: NORMAL
COLLECTION METHOD: NORMAL
GLUCOSE UR-MCNC: NORMAL
HCG UR QL: 0.2 EU/DL
HGB UR QL STRIP.AUTO: NORMAL
KETONES UR-MCNC: NORMAL
LEUKOCYTE ESTERASE UR QL STRIP: NORMAL
NITRITE UR QL STRIP: NORMAL
PH UR STRIP: 6
PROT UR STRIP-MCNC: NORMAL
SP GR UR STRIP: 1.02

## 2024-11-01 PROCEDURE — 99213 OFFICE O/P EST LOW 20 MIN: CPT | Mod: 25

## 2024-11-01 PROCEDURE — 81003 URINALYSIS AUTO W/O SCOPE: CPT | Mod: QW

## 2024-11-20 NOTE — ASSESSMENT
[FreeTextEntry1] : Stable BPH.\par \par Small right renal lesion likely hemorrhagic cyst but too small to characterize on MRI and not seen on sonogram.  Stable left parapelvic cyst.  Recommend radiographic follow-up in future for interval change.\par \par We will try Mycolog-II cream for jock itch
yes

## 2025-02-18 ENCOUNTER — APPOINTMENT (OUTPATIENT)
Dept: CARDIOLOGY | Facility: CLINIC | Age: 70
End: 2025-02-18

## 2025-02-28 ENCOUNTER — APPOINTMENT (OUTPATIENT)
Dept: CARDIOLOGY | Facility: CLINIC | Age: 70
End: 2025-02-28
Payer: MEDICARE

## 2025-02-28 VITALS
HEART RATE: 87 BPM | BODY MASS INDEX: 29.03 KG/M2 | OXYGEN SATURATION: 97 % | DIASTOLIC BLOOD PRESSURE: 80 MMHG | WEIGHT: 185 LBS | HEIGHT: 67 IN | SYSTOLIC BLOOD PRESSURE: 126 MMHG

## 2025-02-28 DIAGNOSIS — R94.31 ABNORMAL ELECTROCARDIOGRAM [ECG] [EKG]: ICD-10-CM

## 2025-02-28 DIAGNOSIS — R07.9 CHEST PAIN, UNSPECIFIED: ICD-10-CM

## 2025-02-28 DIAGNOSIS — E78.5 HYPERLIPIDEMIA, UNSPECIFIED: ICD-10-CM

## 2025-02-28 DIAGNOSIS — R93.49 ABNORMAL RADIOLOGIC FINDINGS ON DIAGNOSITIC IMAGING OF OTHER URINARY ORGANS: ICD-10-CM

## 2025-02-28 PROCEDURE — G2211 COMPLEX E/M VISIT ADD ON: CPT

## 2025-02-28 PROCEDURE — 99213 OFFICE O/P EST LOW 20 MIN: CPT

## 2025-02-28 RX ORDER — METFORMIN HYDROCHLORIDE 500 MG/1
500 TABLET, COATED ORAL
Refills: 0 | Status: ACTIVE | COMMUNITY
